# Patient Record
(demographics unavailable — no encounter records)

---

## 2025-01-06 NOTE — SOCIAL HISTORY
[TextEntry] : Mom is planning to start him in day care by March  lives home with both parents and 6 yo half sister

## 2025-01-06 NOTE — BIRTH HISTORY
[At ___ Weeks Gestation] : at [unfilled] weeks gestation [ Section] : by  section [de-identified] : malpresentation  [FreeTextEntry1] : 2 lbs 12 ounces

## 2025-01-06 NOTE — PLAN
[Adjusted age milestones discussed at length.] : Adjusted age milestones discussed at length. [Adjusted Age growth and feeding parameters discussed at length.] : Adjusted Age growth and feeding parameters discussed at length.  [Safety counseling given regarding major safety issues for children this age.] : Safety counseling given regarding major safety issues for children this age. [All medications should be stored in a child proof container out of reach of the child.] : All medications should be stored in a child proof container out of reach of the child.  [Reading daily was encouraged.] : Reading daily was encouraged.  [Parent was counseled regarding AAP recommendations concerning television watching under the age of two.] : Parent was counseled regarding AAP recommendations concerning television watching under the age of two.  [Toilet training discussed at length.] : Toilet training discussed at length. [Avoid choking hazards such as peanuts, hot dogs, un-cut grapes, hot dogs, peanut butter, fruits with skins and balloons.] : Avoid choking hazards such as peanuts, hot dogs, un-cut grapes, hot dogs, peanut butter, fruits with skins and balloons.  [Discussed dental hygiene, addressed fluoride needs.] : Discussed dental hygiene, addressed fluoride needs.

## 2025-01-06 NOTE — PHYSICAL EXAM
[Crawl] : crawls  [Pull to Stand] : pulls to stand [Walk Alone] : walks alone [Walk Backwards] : walks backwards [Run] : runs [Unfisted] : unfisted [Manipulates Fingers] : manipulates fingers [Transfer] : transfers objects [Unilateral Reach/Grasp] : unilaterally reaches/grasps  [Mature Pincer] : has mature pincer [Voluntary Release] : voluntary release  [Handedness] : hand preference noted [Finger Feeding] : finger feeding  [Spoon] : uses a spoon [Cup] : uses a cup [Watson with Fork] : watson with fork [Helps with Dressing] : helps with dressing  [Helps with Undressing] : helps with undressing [Gesture Language] : gestures language [Understands "No"] : understands "No" [1 Step Command with Gesture] : follows 1 step commands with gesture [1 Step Command without Gesture] : follows 1 step commands without gesture [Points To Body Part] : points to body parts  ["Rodolfo" Appropriately] : says "Rodolfo" appropriately ["Mama" Appropriately] : says "Mama" appropriately [1 Word Other Than Ma/Da] : uses 1 word other than ma/da [Vocabulary Of ___ Words] : has a vocabulary of [unfilled] words [Mature Jargoning] : uses mature jargoning [2 Word Phrases] : uses 2 word phrases [Normal] : no joint swelling, erythema, or tenderness; full range of  motion with no contractures; no muscle tenderness; no clubbing; no cyanosis; no edema [Undresses Completely] : does not undress completely [Buttoning] : does not button clothes [2 Step Commands] : does not follow 2 step commands [Uses Pronouns Appropriately] : uses pronouns inappropriately [Uses 3 Word Sentences] : does not use three word sentences [de-identified] : can kick, throw, walk up and down the stairs with assistance [de-identified] : engages in pretend play with his trucks, stuff animals, cook, can take off socks, hat, shoes, pamper,  [de-identified] : good at imitating what others do and say [de-identified] : tv, monkey, cars, food, eat, juice, water, oh no, yes, up, down, in, hi, more, open, close, ball, thank you, book, yellow, red, green, blue, close door, knows some color, starting to count and alphabets

## 2025-01-06 NOTE — HISTORY OF PRESENT ILLNESS
[Gestational Age: ___] : Gestational Age in Weeks: [unfilled] [Chronological Age: ___] : Chronological Age in Months: [unfilled] [No Parental Concerns] : no parental concerns [Finger Food] : finger food [Table Food] : table food [Normal] : normal [None] : none [de-identified] : no more bottle  use  [de-identified] : [place] milk and whole 1-2 cups a day [de-identified] : good eater  [de-identified] : 10-12 hr a night, takes 1 nap during the day

## 2025-01-06 NOTE — REASON FOR VISIT
[Follow-Up ] : a  follow-up for [Mother] : mother [FreeTextEntry2] : assess for developmental delay secondary to prematurity 27 weeks [FreeTextEntry3] : Developmental and behavioral progress is of the utmost importance and involves complex nuance. Monitoring children with developmental and behavioral concerns is essential due to potential lifelong implications of diagnoses.

## 2025-04-28 NOTE — SOCIAL HISTORY
[TextEntry] : Mom is planning to start him in day care by March  lives home with both parents and 4 yo half sister

## 2025-04-28 NOTE — HISTORY OF PRESENT ILLNESS
[Gestational Age: ___] : Gestational Age in Weeks: [unfilled] [Chronological Age: ___] : Chronological Age in Months: [unfilled] [No Parental Concerns] : no parental concerns [Finger Food] : finger food [Table Food] : table food [Normal] : normal [None] : none [de-identified] : [place] milk and whole 1-2 cups a day [de-identified] : good eater  [de-identified] : 10-12 hr a night, takes 1 nap during the day

## 2025-04-28 NOTE — BIRTH HISTORY
[At ___ Weeks Gestation] : at [unfilled] weeks gestation [ Section] : by  section [de-identified] : malpresentation  [FreeTextEntry1] : 2 lbs 12 ounces